# Patient Record
Sex: FEMALE | Race: WHITE | NOT HISPANIC OR LATINO | Employment: OTHER | URBAN - METROPOLITAN AREA
[De-identification: names, ages, dates, MRNs, and addresses within clinical notes are randomized per-mention and may not be internally consistent; named-entity substitution may affect disease eponyms.]

---

## 2017-08-23 ENCOUNTER — OFFICE VISIT (OUTPATIENT)
Dept: OBSTETRICS AND GYNECOLOGY | Facility: CLINIC | Age: 62
End: 2017-08-23

## 2017-08-23 VITALS
RESPIRATION RATE: 16 BRPM | OXYGEN SATURATION: 97 % | BODY MASS INDEX: 30.99 KG/M2 | SYSTOLIC BLOOD PRESSURE: 118 MMHG | DIASTOLIC BLOOD PRESSURE: 70 MMHG | WEIGHT: 186 LBS | HEART RATE: 47 BPM | HEIGHT: 65 IN

## 2017-08-23 DIAGNOSIS — N90.89 VULVAR LESION: ICD-10-CM

## 2017-08-23 DIAGNOSIS — N39.41 URGE URINARY INCONTINENCE: ICD-10-CM

## 2017-08-23 DIAGNOSIS — Z01.419 WELL WOMAN EXAM WITH ROUTINE GYNECOLOGICAL EXAM: Primary | ICD-10-CM

## 2017-08-23 PROCEDURE — G0101 CA SCREEN;PELVIC/BREAST EXAM: HCPCS | Performed by: NURSE PRACTITIONER

## 2017-08-23 RX ORDER — OXYBUTYNIN CHLORIDE 10 MG/1
10 TABLET, EXTENDED RELEASE ORAL DAILY
Qty: 30 TABLET | Refills: 3 | Status: SHIPPED | OUTPATIENT
Start: 2017-08-23 | End: 2017-12-06 | Stop reason: SDUPTHER

## 2017-08-23 RX ORDER — DIVALPROEX SODIUM 250 MG/1
TABLET, DELAYED RELEASE ORAL
Refills: 0 | COMMUNITY
Start: 2017-07-07 | End: 2020-05-04

## 2017-08-23 RX ORDER — POLYETHYLENE GLYCOL 3350 17 G/17G
POWDER, FOR SOLUTION ORAL
Refills: 5 | COMMUNITY
Start: 2017-08-01

## 2017-08-23 RX ORDER — HYDROCHLOROTHIAZIDE 12.5 MG/1
CAPSULE, GELATIN COATED ORAL
Refills: 1 | COMMUNITY
Start: 2017-07-07 | End: 2021-04-20 | Stop reason: ALTCHOICE

## 2017-08-23 RX ORDER — HYDROXYZINE HYDROCHLORIDE 25 MG/1
TABLET, FILM COATED ORAL
Refills: 1 | COMMUNITY
Start: 2017-08-01 | End: 2020-05-04

## 2017-08-23 RX ORDER — ACETAMINOPHEN, ASPIRIN AND CAFFEINE 250; 250; 65 MG/1; MG/1; MG/1
1 TABLET, FILM COATED ORAL EVERY 6 HOURS PRN
COMMUNITY
End: 2021-04-20

## 2017-08-23 RX ORDER — CLONIDINE HYDROCHLORIDE 0.2 MG/1
TABLET ORAL
Refills: 0 | COMMUNITY
Start: 2017-08-01 | End: 2017-12-06 | Stop reason: DRUGHIGH

## 2017-08-23 RX ORDER — ATORVASTATIN CALCIUM 20 MG/1
20 TABLET, FILM COATED ORAL DAILY
COMMUNITY
End: 2020-05-04

## 2017-08-23 RX ORDER — LISINOPRIL 40 MG/1
TABLET ORAL
Refills: 1 | COMMUNITY
Start: 2017-07-07 | End: 2017-12-06 | Stop reason: DRUGHIGH

## 2017-08-23 RX ORDER — TIZANIDINE 2 MG/1
TABLET ORAL
Refills: 1 | COMMUNITY
Start: 2017-06-20 | End: 2021-04-20

## 2017-08-29 ENCOUNTER — TELEPHONE (OUTPATIENT)
Dept: GYNECOLOGIC ONCOLOGY | Facility: CLINIC | Age: 62
End: 2017-08-29

## 2017-08-29 NOTE — PROGRESS NOTES
[facility] NEW PATIENT ANNUAL WELL WOMAN VISIT      Noemi Casey  [id number]  1955      Chief Complaint: [facility] (Genital lesion)        History of present illness:    Noemi Casey is a 62 y.o. year old  menopausal female, new to our office, presenting to [facility]. She is in need of an annual exam, but presents with c/o vulvar lesion x1 year and urinary incontinence.  This past year she has not been on hormone replacement therapy.  There has not been vaginal bleeding in the last 12 months.  Menopausal symptoms are not present.    She reports she has been menopausal since her complete hysterectomy in . She has not had any regular GYN care in over 5 years. She does not know of last pap smear date, last mammogram was over 5 years ago, and she has never had a colonoscopy. History is significant for abnormal pap smears, s/p cryo, and genital warts. She also reports having bladder polyps removed surgically in  and was told these were cancer but no other treatments or follow-ups were needed. She has been a lifetime smoker who has been cutting back over the last 5 years and quit 2 months ago. She states she is very motivated to remain tobacco free.     She first noticed this new lesion approx 1 year ago. She states it has gotten somewhat larger, is darker than the surrounding tissue, and has irregular borders. It does not itch or bleed. It is not painful. She would like this examined and biopsied if necessary today.   She also c/o urinary incontinence that requires her to wear a pad at all times. She reports little to no sensation prior to leaking during the day. She has sensation at night, but cannot make it to the restroom once she feels the urge. She has frequency and feels she does not always empty completely.     SEXUAL Hx:  She is not currently sexually active.  In the past year there has not been new sexual partners.    Condoms are not typically used.  She would not  like to be screened for STD's at today's exam.  HEALTH Hx:  She exercises regularly: no (and has no plans to become more active).  She wears her seat belt:yes.  She has concerns about domestic violence: no.  She has noticed changes in height: no.   She is a former smoker, quit <1 years ago.    Past Medical History:   Diagnosis Date   • Bipolar 1 disorder    • Bladder polyp    • Bowel obstruction 2005   • Bradycardia    • Genital warts due to HPV (human papillomavirus)    • History of abnormal cervical Pap smear    • Hyperlipidemia    • Hypertension        Past Surgical History:   Procedure Laterality Date   • COLON RESECTION     • CRYOABLATION  1979   • TOTAL ABDOMINAL HYSTERECTOMY WITH SALPINGO OOPHORECTOMY Bilateral 1999       MEDICATIONS: The current medication list was reviewed and reconciled.     Allergies:  has No Known Allergies.    Family History   Problem Relation Age of Onset   • Ovarian cancer Mother    • Osteoporosis Mother    • Ovarian cancer Sister    • Hypertension Father    • Heart attack Father    • Stroke Father    • Diabetes Father    • Hypertension Brother        Health Maintenance: All overdue. Last pap date is unknown, she does have a history of abnormal pap smears. Last mammogram was over 5 years ago. She has never had a colonoscopy or DEXA.     Review of Systems   Constitutional: Negative for fatigue, fever and unexpected weight change.   HENT: Negative for congestion, ear pain, hearing loss, sinus pressure and trouble swallowing.    Eyes: Negative for visual disturbance.   Respiratory: Negative for cough, chest tightness, shortness of breath and wheezing.    Cardiovascular: Negative for chest pain, palpitations and leg swelling.   Gastrointestinal: Negative for abdominal distention, abdominal pain, constipation, diarrhea, nausea and vomiting.   Endocrine: Negative for cold intolerance, heat intolerance, polydipsia, polyphagia and polyuria.   Genitourinary: Positive for difficulty urinating  "(incomplete emptying and leaking throughout the day), frequency, genital sores (non-painful lesion x 1 year) and urgency. Negative for dysuria, hematuria, pelvic pain, vaginal bleeding, vaginal discharge and vaginal pain.   Musculoskeletal: Negative for arthralgias, gait problem, joint swelling and myalgias.   Skin: Negative for color change, pallor and rash.   Neurological: Negative for dizziness, seizures, syncope, weakness, light-headedness, numbness and headaches.   Hematological: Negative for adenopathy. Does not bruise/bleed easily.   Psychiatric/Behavioral: Negative for agitation, confusion, sleep disturbance and suicidal ideas. The patient is not nervous/anxious.        Physical Exam  Vital Signs: /70  Pulse (!) 47  Resp 16  Ht 65\" (165.1 cm)  Wt 186 lb (84.4 kg)  LMP  (LMP Unknown)  SpO2 97%  Breastfeeding? No  BMI 30.95 kg/m2   General Appearance:  alert, cooperative, no apparent distress and appears stated age   Neurologic/Psychiatric: A&O x 3, gait steady, appropriate affect   HEENT:  Normocephalic, without obvious abnormality, mucous membranes moist   Neck: Supple, symmetrical, trachea midline, no adenopathy;  No thyromegaly, masses, or tenderness   Back:   Symmetric, no curvature, ROM normal, no CVA tenderness   Lungs:   Clear to auscultation bilaterally; respirations regular, even, and unlabored bilaterally   Heart:  Regular rate and rhythm, no murmurs appreciated   Breasts:  Symmetrical, no masses, no lesions and no nipple discharge   Abdomen:   Soft, non-tender, non-distended and no organomegaly   Lymph nodes: No cervical, supraclavicular, inguinal or axillary adenopathy noted   Extremities: Normal, atraumatic; no clubbing, cyanosis, or edema    Skin: No rashes, ulcers, or suspicious lesions noted   Pelvic: External Genitalia  generally atrophic with 2 cm dark pigmented lesion to posterior left vulva. Lesion is slightly raised with irregular borders.   Vagina  is pale, atrophic. "   Vaginal Cuff  Female Vaginal Cuff: smooth, intact, without visible lesions   Uterus  surgically absent  Ovaries  surgically absent bilaterallly  Parametria  smooth  Rectovaginal  Female rectovaginal: confirms no masses or bleeding and Hemoccult negative       Procedure Note:  After discussion of procedure and obtaining consent a vulvar punch biopsy was performed. Lesion located upon posterior left vulva, approx 2 cm in size. Site cleaned with betadine in the usual fashion. 3 mL lidocaine with epinephrine injected beneath lesion for local anesthesia. Punch biopsy performed and tissue sample cut away from underlying tissue. Specimen placed into appropriate container and labeled at bedside. Bleeding was Minimal. Silver nitrate used to achieve hemostasis. Patient tolerated the procedure well.       Assessment and Plan:    Noemi was seen today for establish care.    Diagnoses and all orders for this visit:    Well woman exam with routine gynecological exam    Vulvar lesion  -     Tissue Pathology Exam; Future  -     Tissue Pathology Exam  -     Pap IG, Rfx HPV ASCU; Future    Urge urinary incontinence  -     oxybutynin XL (DITROPAN-XL) 10 MG 24 hr tablet; Take 1 tablet by mouth Daily.    Biopsy taken of vulvar lesion in office today. This will be sent to pathology and I will notify her of results and any needed next steps upon return. Home care of biopsy site reviewed. Encouraged to call with any signs of bleeding or infection.     We discussed her urinary symptoms. This is most consistent with urge UI. There is no evidence of prolapse upon exam today. She is understanding that UI treatment can be a stepwise process and other medications may need to be tried if this is not effective. Urodynamic testing or more advanced interventions may also be considered in the future if symptoms persist.     Pap was done today.  If she does not receive the results of the Pap within 2 weeks  time, she was instructed to call to find  out the results.  I explained to Noemi that the recommendations for Pap smear interval in a low risk patient's has lengthened to 3 years time.  I encouraged her to be seen yearly for a full physical exam including breast and pelvic exam even during the off years when PAP's will not be performed.    She was encouraged to get yearly mammograms.  She should report any palpable breast lump(s) or skin changes regardless of mammographic findings.  I explained to Noemi that notification regarding her mammogram results will come from the center performing the study.  Our office will not be routinely calling with mammogram results.  It is her responsibility to make sure that the results from the mammogram are communicated to her by the breast center.  If she has any questions about the results, she is welcome to call our office anytime.  The phone number to schedule a mammogram will be given to Noemi today at the time of check out.  I explained that she should be able to call the center directly to schedule her screening mammogram without a physician's order.  So long as she gives them our name, a copy of the mammogram report should be sent to us for review.    We discussed recommendations for colon cancer screening. She declines referral at this time.     She was recommended to begin bone density scans (DEXA) at age 60-65 for osteoporosis screening.    Return in about 3 months (around 11/23/2017) for bladder medication follow-up.      Mony Rebolledo, YANIRA      Note: Speech recognition transcription software was used to dictate portions of this document.  An attempt at proofreading has been made though minor errors in transcription may still be present.  Please do not hesitate to call our office with any questions.

## 2017-08-29 NOTE — TELEPHONE ENCOUNTER
Called patient to notify vulvar biopsy results have returned, benign seborrheic keratosis. No evidence of dysplasia or viral change. She is very pleased to hear this. No additional follow-up is needed. She is encouraged to call throughout the year with any questions, concerns, or general GYN problems. Otherwise, RTC for annual exam next year. She v/u.

## 2017-12-06 ENCOUNTER — OFFICE VISIT (OUTPATIENT)
Dept: OBSTETRICS AND GYNECOLOGY | Facility: CLINIC | Age: 62
End: 2017-12-06

## 2017-12-06 VITALS
BODY MASS INDEX: 31.29 KG/M2 | OXYGEN SATURATION: 94 % | SYSTOLIC BLOOD PRESSURE: 98 MMHG | WEIGHT: 187.8 LBS | HEIGHT: 65 IN | DIASTOLIC BLOOD PRESSURE: 60 MMHG | HEART RATE: 45 BPM | RESPIRATION RATE: 14 BRPM

## 2017-12-06 DIAGNOSIS — N39.41 URGE URINARY INCONTINENCE: Primary | ICD-10-CM

## 2017-12-06 PROCEDURE — 99212 OFFICE O/P EST SF 10 MIN: CPT | Performed by: NURSE PRACTITIONER

## 2017-12-06 RX ORDER — OXYBUTYNIN CHLORIDE 10 MG/1
10 TABLET, EXTENDED RELEASE ORAL DAILY
Qty: 30 TABLET | Refills: 12 | Status: SHIPPED | OUTPATIENT
Start: 2017-12-06 | End: 2021-04-14 | Stop reason: ALTCHOICE

## 2017-12-06 RX ORDER — CLONIDINE HYDROCHLORIDE 0.3 MG/1
TABLET ORAL
Refills: 6 | COMMUNITY
Start: 2017-11-08

## 2017-12-06 RX ORDER — BUSPIRONE HYDROCHLORIDE 15 MG/1
TABLET ORAL
Refills: 5 | COMMUNITY
Start: 2017-11-02 | End: 2020-05-04

## 2017-12-06 RX ORDER — LISINOPRIL AND HYDROCHLOROTHIAZIDE 20; 12.5 MG/1; MG/1
TABLET ORAL
Refills: 6 | COMMUNITY
Start: 2017-11-07 | End: 2021-04-14 | Stop reason: ALTCHOICE

## 2017-12-06 NOTE — PROGRESS NOTES
[facility] FOLLOW-UP    Noemi Casey  [id number]  1955    Chief Complaint: Follow-up (No complaints)        History of present illness:  Noemi Casey is a 62 y.o. year old female who is here today for follow-up for new bladder medication.  She was last seen as a new patient on 8/23/2017.  At that time she underwent vulvar biopsy for a suspicious lesion that was found to be benign.  She also had concerns regarding urinary urgency incontinence that was interfering with daily life.  She was needing a pad at all times and describes little to no sensation prior to leaking during the day.  She was started on management with oxybutynin XL 10 mg daily.  She reports this medication has been very helpful.  She has little to no leaking now and is able to often go without a pad.  She is very happy with her medication and requests refills.    Although Noemi is feeling physically very well today, she is somewhat tearful today related to stress at home.  She lives with her son who has recently been granted full custody of her 12 year old grandson.  This child has had a great deal of turmoil in his life related to birth trauma, home instability, and a mother with numerous addictions.  He is currently in special needs classes, but Noemi has been surprised to see how very far behind he is.  She describes a great deal worry about this child and is trying to work with the school to help him more at home.     Past Medical History:   Diagnosis Date   • Bipolar 1 disorder    • Bladder polyp    • Bowel obstruction 2005   • Bradycardia    • Genital warts due to HPV (human papillomavirus)    • History of abnormal cervical Pap smear    • Hyperlipidemia    • Hypertension        Past Surgical History:   Procedure Laterality Date   • COLON RESECTION     • CRYOABLATION  1979   • TOTAL ABDOMINAL HYSTERECTOMY WITH SALPINGO OOPHORECTOMY Bilateral 1999       MEDICATIONS: The current medication list was reviewed and  "reconciled.     Allergies:  has No Known Allergies.    Family History   Problem Relation Age of Onset   • Ovarian cancer Mother    • Osteoporosis Mother    • Ovarian cancer Sister    • Hypertension Father    • Heart attack Father    • Stroke Father    • Diabetes Father    • Hypertension Brother        Review of Systems   Constitutional: Negative for appetite change, chills, fatigue, fever and unexpected weight change.   Respiratory: Negative for cough, shortness of breath and wheezing.    Cardiovascular: Negative for chest pain, palpitations and leg swelling.   Gastrointestinal: Negative for abdominal distention, abdominal pain, blood in stool, constipation, diarrhea, nausea and vomiting.   Endocrine: Negative.    Genitourinary: Negative for dyspareunia, dysuria, frequency, genital sores, hematuria, pelvic pain, urgency, vaginal bleeding, vaginal discharge and vaginal pain.   Musculoskeletal: Negative for arthralgias, gait problem and joint swelling.   Neurological: Negative for dizziness, seizures, syncope, weakness, light-headedness, numbness and headaches.   Hematological: Negative for adenopathy.   Psychiatric/Behavioral: The patient is nervous/anxious (r/t stress at home).        Physical Exam  Vital Signs: BP 98/60  Pulse (!) 45  Resp 14  Ht 165.1 cm (65\")  Wt 85.2 kg (187 lb 12.8 oz)  LMP  (LMP Unknown)  SpO2 94%  Breastfeeding? No  BMI 31.25 kg/m2   General Appearance:  alert, cooperative, no apparent distress and appears stated age.   Neurologic/Psychiatric: A&O x 3, gait steady, appropriate affect. Tearful today.    HEENT:  Normocephalic, without obvious abnormality, mucous membranes moist   Lungs:   Clear to auscultation bilaterally; respirations regular, even, and unlabored bilaterally   Heart:  Regular rate and rhythm, no murmurs appreciated   Abdomen:   Soft, non-tender, non-distended and no organomegaly   Extremities: Normal, atraumatic; no clubbing, cyanosis, or edema    Pelvic: deferred "       Procedure Notes:  No notes on file    Assessment and Plan:    Noemi was seen today for follow-up.    Diagnoses and all orders for this visit:    Urge urinary incontinence  -     oxybutynin XL (DITROPAN-XL) 10 MG 24 hr tablet; Take 1 tablet by mouth Daily.      Successful management of urge urinary incontinence on oxybutynin, refills sent to local pharmacy ×1 year.    Noemi and I spent the remainder of her visit discussing her stresses at home.  She states she is just happy to have someone listen.  She does have a psychologist through her home health who visits once weekly.  Noemi has her direct contact and is able to contact them directly at any time for any urgent need.  She is encouraged to call our office for any gynecologic needs and  I am also happy to get her in contact with social work or therapy here at [facility] if they can be of any assistance. I am always happy to listen and certainly wish the best for her and her family.     Return for annual exam or PRN.      YANIRA Rebolledo      Note: Speech recognition transcription software was used to dictate portions of this document.  An attempt at proofreading has been made though minor errors in transcription may still be present.  Please do not hesitate to call our office with any questions.

## 2018-07-22 ENCOUNTER — LAB REQUISITION (OUTPATIENT)
Dept: LAB | Facility: HOSPITAL | Age: 63
End: 2018-07-22

## 2018-07-22 DIAGNOSIS — N39.0 URINARY TRACT INFECTION: ICD-10-CM

## 2018-07-22 LAB
BACTERIA UR QL AUTO: ABNORMAL /HPF
BILIRUB UR QL STRIP: NEGATIVE
CLARITY UR: ABNORMAL
COLOR UR: YELLOW
GLUCOSE UR STRIP-MCNC: NEGATIVE MG/DL
HGB UR QL STRIP.AUTO: ABNORMAL
HYALINE CASTS UR QL AUTO: ABNORMAL /LPF
KETONES UR QL STRIP: NEGATIVE
LEUKOCYTE ESTERASE UR QL STRIP.AUTO: ABNORMAL
NITRITE UR QL STRIP: POSITIVE
PH UR STRIP.AUTO: 5.5 [PH] (ref 5–8)
PROT UR QL STRIP: ABNORMAL
RBC # UR: ABNORMAL /HPF
REF LAB TEST METHOD: ABNORMAL
SP GR UR STRIP: 1.02 (ref 1–1.03)
SQUAMOUS #/AREA URNS HPF: ABNORMAL /HPF
UROBILINOGEN UR QL STRIP: ABNORMAL
WBC CLUMPS # UR AUTO: ABNORMAL /HPF
WBC UR QL AUTO: ABNORMAL /HPF

## 2018-07-22 PROCEDURE — 81001 URINALYSIS AUTO W/SCOPE: CPT | Performed by: INTERNAL MEDICINE

## 2018-07-22 PROCEDURE — 87077 CULTURE AEROBIC IDENTIFY: CPT | Performed by: INTERNAL MEDICINE

## 2018-07-22 PROCEDURE — 87086 URINE CULTURE/COLONY COUNT: CPT | Performed by: INTERNAL MEDICINE

## 2018-07-22 PROCEDURE — 87186 SC STD MICRODIL/AGAR DIL: CPT | Performed by: INTERNAL MEDICINE

## 2018-07-24 LAB — BACTERIA SPEC AEROBE CULT: ABNORMAL

## 2018-12-14 RX ORDER — OXYBUTYNIN CHLORIDE 10 MG/1
10 TABLET, EXTENDED RELEASE ORAL DAILY
Qty: 30 TABLET | Refills: 0 | OUTPATIENT
Start: 2018-12-14

## 2021-02-25 ENCOUNTER — TRANSCRIBE ORDERS (OUTPATIENT)
Dept: ADMINISTRATIVE | Facility: HOSPITAL | Age: 66
End: 2021-02-25

## 2021-02-25 DIAGNOSIS — N64.4 BREAST PAIN: Primary | ICD-10-CM

## 2021-03-25 ENCOUNTER — HOSPITAL ENCOUNTER (OUTPATIENT)
Dept: MAMMOGRAPHY | Facility: HOSPITAL | Age: 66
Discharge: HOME | End: 2021-03-25
Admitting: NURSE PRACTITIONER

## 2021-03-25 DIAGNOSIS — N64.4 BREAST PAIN: ICD-10-CM

## 2021-03-25 PROCEDURE — G0279 TOMOSYNTHESIS, MAMMO: HCPCS

## 2021-03-25 PROCEDURE — 77066 DX MAMMO INCL CAD BI: CPT

## 2021-04-14 RX ORDER — ATORVASTATIN CALCIUM 20 MG/1
40 TABLET, FILM COATED ORAL EVERY OTHER DAY
COMMUNITY

## 2021-04-14 RX ORDER — CHLORTHALIDONE 25 MG/1
12.5 TABLET ORAL DAILY
COMMUNITY
End: 2021-04-20

## 2021-04-14 RX ORDER — TOLTERODINE 4 MG/1
4 CAPSULE, EXTENDED RELEASE ORAL DAILY
COMMUNITY
End: 2021-04-20

## 2021-04-14 RX ORDER — UBIDECARENONE 100 MG
100 CAPSULE ORAL DAILY
COMMUNITY

## 2021-04-14 RX ORDER — LISINOPRIL 20 MG/1
20 TABLET ORAL DAILY
COMMUNITY
End: 2021-04-20 | Stop reason: ALTCHOICE

## 2021-04-20 ENCOUNTER — OFFICE VISIT (OUTPATIENT)
Dept: GASTROENTEROLOGY | Facility: CLINIC | Age: 66
End: 2021-04-20

## 2021-04-20 VITALS
SYSTOLIC BLOOD PRESSURE: 153 MMHG | DIASTOLIC BLOOD PRESSURE: 88 MMHG | TEMPERATURE: 96.4 F | RESPIRATION RATE: 16 BRPM | BODY MASS INDEX: 26.2 KG/M2 | HEART RATE: 69 BPM | HEIGHT: 66 IN | WEIGHT: 163 LBS

## 2021-04-20 DIAGNOSIS — Z01.818 PREOP TESTING: Primary | ICD-10-CM

## 2021-04-20 DIAGNOSIS — Z90.49 HISTORY OF COLON RESECTION: ICD-10-CM

## 2021-04-20 DIAGNOSIS — Z86.010 PERSONAL HISTORY OF COLONIC POLYPS: Primary | ICD-10-CM

## 2021-04-20 DIAGNOSIS — K59.04 CHRONIC IDIOPATHIC CONSTIPATION: ICD-10-CM

## 2021-04-20 PROBLEM — Z86.0100 PERSONAL HISTORY OF COLONIC POLYPS: Status: ACTIVE | Noted: 2021-04-20

## 2021-04-20 PROCEDURE — 99213 OFFICE O/P EST LOW 20 MIN: CPT | Performed by: NURSE PRACTITIONER

## 2021-04-20 RX ORDER — BISACODYL 5 MG/1
TABLET, DELAYED RELEASE ORAL
Qty: 4 TABLET | Refills: 0 | Status: SHIPPED | OUTPATIENT
Start: 2021-04-20

## 2021-04-20 RX ORDER — SODIUM CHLORIDE 9 MG/ML
70 INJECTION, SOLUTION INTRAVENOUS CONTINUOUS PRN
Status: CANCELLED | OUTPATIENT
Start: 2021-04-20

## 2021-04-20 RX ORDER — LISINOPRIL AND HYDROCHLOROTHIAZIDE 20; 12.5 MG/1; MG/1
1 TABLET ORAL 2 TIMES DAILY
COMMUNITY

## 2021-04-20 RX ORDER — MULTIVIT WITH MINERALS/LUTEIN
1000 TABLET ORAL DAILY
COMMUNITY

## 2021-04-20 RX ORDER — DIPHENOXYLATE HYDROCHLORIDE AND ATROPINE SULFATE 2.5; .025 MG/1; MG/1
TABLET ORAL DAILY
COMMUNITY

## 2021-04-20 RX ORDER — POLYETHYLENE GLYCOL 3350 17 G/17G
POWDER, FOR SOLUTION ORAL
Qty: 238 G | Refills: 0 | Status: SHIPPED | OUTPATIENT
Start: 2021-04-20 | End: 2021-11-08 | Stop reason: SDUPTHER

## 2021-04-20 NOTE — PROGRESS NOTES
New Patient Consult      Date: 2021   Patient Name: Noemi Casey  MRN: [id number]  : 1955     Primary Care Provider: Lucita Coelho APRN    Chief Complaint   Patient presents with   • Colon Cancer Screening     History of Present Illness: Noemi Casey is a 66 y.o. female who is here today to establish care with Gastroenterology for colon cancer screening.    The patient has a history of constipation for over 50 years. She has 1 bowel movement per week if she does not take Miralax. With Miralax, she is having at least 1 bowel movement per day. No significant rectal bleeding.  No abdominal pain. She had a bowel obstruction in  and had to have surgery. She is unsure of what type of surgery was performed and what, if any, type of bowel was removed.     No history of nausea or vomiting. No reflux or difficulty swallowing.     Her last colonoscopy was in 2018 with tubular adenomas by Dr. Jones in [place], [place] and was recommended to have colonoscopy in 3 years. She has not had EGD in the past.  Her grandfather had liver cancer. No family history of colon or stomach cancer.     Subjective      Past Medical History:   Diagnosis Date   • Bipolar 1 disorder (CMS/HCC)    • Bladder polyp    • Bowel obstruction (CMS/HCC)    • Bradycardia    • Colon polyp    • Diverticulosis    • Genital warts due to HPV (human papillomavirus)    • History of abnormal cervical Pap smear    • History of bladder cancer    • History of colon polyps    • History of traumatic brain injury    • Hyperlipidemia    • Hypertension    • Internal hemorrhoids    • MVP (mitral valve prolapse)    • Pain in both feet    • Palpitations    • Tattoo      Past Surgical History:   Procedure Laterality Date   • BLADDER SURGERY  2008    cancer   • COLON RESECTION      due to bowel obstruction   • COLONOSCOPY  2018   • CRYOABLATION     • TONSILLECTOMY     • TOTAL ABDOMINAL HYSTERECTOMY WITH SALPINGO  OOPHORECTOMY Bilateral 1999     Family History   Problem Relation Age of Onset   • Ovarian cancer Mother    • Osteoporosis Mother    • Ovarian cancer Sister    • Hypertension Father    • Heart attack Father    • Stroke Father    • Diabetes Father    • Hypertension Brother    • Liver cancer Paternal Grandfather    • Colon cancer Neg Hx      Social History     Socioeconomic History   • Marital status:      Spouse name: Not on file   • Number of children: Not on file   • Years of education: Not on file   • Highest education level: Not on file   Tobacco Use   • Smoking status: Current Every Day Smoker     Packs/day: 1.00     Types: Cigarettes   • Smokeless tobacco: Never Used   Vaping Use   • Vaping Use: Never used   Substance and Sexual Activity   • Alcohol use: No   • Drug use: No   • Sexual activity: Defer       Current Outpatient Medications:   •  atorvastatin (LIPITOR) 20 MG tablet, Take 40 mg by mouth Every Other Day., Disp: , Rfl:   •  Cariprazine HCl (Vraylar) 1.5 MG capsule capsule, Take 1.5 mg by mouth Daily., Disp: , Rfl:   •  CloNIDine (CATAPRES) 0.3 MG tablet, TK 1 T PO BID, Disp: , Rfl: 6  •  coenzyme Q10 100 MG capsule, Take 100 mg by mouth Daily., Disp: , Rfl:   •  lisinopril-hydrochlorothiazide (PRINZIDE,ZESTORETIC) 20-12.5 MG per tablet, Take 1 tablet by mouth 2 (two) times a day., Disp: , Rfl:   •  metoprolol tartrate (LOPRESSOR) 25 MG tablet, 50 mg Daily., Disp: , Rfl: 1  •  multivitamin (MULTI-VITAMIN DAILY PO), Take  by mouth Daily., Disp: , Rfl:   •  Omega-3 Fatty Acids (OMEGA 3 500 PO), Take 500 mg by mouth Daily., Disp: , Rfl:   •  polyethylene glycol (MIRALAX) powder, MIX 17 GRAMS WITH LIQUID AND TK PO QD PRN, Disp: , Rfl: 5  •  vitamin C (ASCORBIC ACID) 250 MG tablet, Take 1,000 mg by mouth Daily., Disp: , Rfl:   •  bisacodyl (DULCOLAX) 5 MG EC tablet, Take as directed for colon prep, Disp: 4 tablet, Rfl: 0  •  polyethylene glycol (MiraLax) 17 GM/SCOOP powder, Take as directed for  colonoscopy prep, Disp: 238 g, Rfl: 0    No Known Allergies     The following portions of the patient's history were reviewed and updated as appropriate: allergies, current medications, past family history, past medical history, past social history, past surgical history and problem list.    Objective     Physical Exam  Vitals and nursing note reviewed.   Constitutional:       General: She is awake. She is not in acute distress.     Appearance: Normal appearance. She is well-developed. She is not diaphoretic.   HENT:      Head: Normocephalic and atraumatic.      Right Ear: Hearing and external ear normal.      Left Ear: Hearing and external ear normal.      Nose: Nose normal.      Mouth/Throat:      Mouth: Mucous membranes are not pale, not dry and not cyanotic.   Eyes:      General: Lids are normal.         Right eye: No discharge.         Left eye: No discharge.      Conjunctiva/sclera: Conjunctivae normal.   Neck:      Thyroid: No thyroid mass or thyromegaly.      Vascular: No JVD.      Trachea: Trachea normal.   Cardiovascular:      Rate and Rhythm: Normal rate and regular rhythm.      Heart sounds: Normal heart sounds and S2 normal. No murmur heard.   No friction rub. No gallop. No S3 sounds.    Pulmonary:      Effort: Pulmonary effort is normal. No respiratory distress.      Breath sounds: Normal breath sounds.   Chest:      Chest wall: No tenderness.   Abdominal:      General: Bowel sounds are normal. There is no distension.      Palpations: Abdomen is not rigid. There is no hepatomegaly, splenomegaly or mass.      Tenderness: There is no abdominal tenderness. There is no guarding or rebound.      Hernia: No hernia is present.       Musculoskeletal:         General: Normal range of motion.      Cervical back: Neck supple. No edema.   Lymphadenopathy:      Cervical: No cervical adenopathy.      Upper Body:      Left upper body: No supraclavicular adenopathy.   Skin:     General: Skin is warm and dry.       "Coloration: Skin is not pale.      Findings: No rash.      Nails: There is no clubbing.   Neurological:      Mental Status: She is alert and oriented to person, place, and time.      Cranial Nerves: No cranial nerve deficit.      Sensory: No sensory deficit.   Psychiatric:         Mood and Affect: Mood normal.         Speech: Speech normal.         Behavior: Behavior is cooperative.       Vitals:    04/20/21 0909   BP: 153/88   Pulse: 69   Resp: 16   Temp: 96.4 °F (35.8 °C)   Weight: 73.9 kg (163 lb)   Height: 167.6 cm (66\")     Results Review:   I have reviewed the patient's new clinical and imaging results.    No labs or imaging to review     Assessment / Plan      1. Personal history of colonic polyps  Last colonoscopy was in 2018 with tubular adenomas per Dr. Jones in [facility].  She was recommended to have colonoscopy in 3 years.  There is no family history of colon cancer.  Colonoscopy for surveillance.    - Case Request; Standing  - Implement Anesthesia Orders Day of Procedure; Standing  - Obtain Informed Consent; Standing  - Verify Bowel Prep Was Successful; Standing  - Oxygen Therapy- Nasal Cannula; 2 LPM; Titrate for SPO2: equal to or greater than, 90%; Standing  - sodium chloride 0.9 % infusion  - Case Request  - polyethylene glycol (MiraLax) 17 GM/SCOOP powder; Take as directed for colonoscopy prep  Dispense: 238 g; Refill: 0  - bisacodyl (DULCOLAX) 5 MG EC tablet; Take as directed for colon prep  Dispense: 4 tablet; Refill: 0    2. History of colon resection  Patient had colon resection in 2007 for a bowel obstruction.  Patient is unsure of what, if any, type of bowel was removed. Well healed periumbilical abdominal scar noted.    3. Chronic idiopathic constipation  History of constipation since she was a small child.  She takes MiraLAX daily with reasonable control, she has at least one bowel movement per day.  No history of significant rectal bleeding.  Low fiber diet with liberal water " intake.   Continue Miralax daily.    Patient Instructions   1. Low fiber diet with liberal water intake.   2. Miralax 17 grams daily in 8 ounces of liquid  Colonoscopy: Description of the procedure, risks, benefits, alternatives and options, including nonoperative options, were discussed with the patient in detail. The patient understands and wishes to proceed.  COVID-19 testing prior to procedure. The patient will need to self-quarantine after testing until the procedure. Instructions given to patient.     Tonja Barrett, APRN  4/20/2021    Please note that portions of this note may have been completed with a voice recognition program. Efforts were made to edit the dictations, but occasionally words are mistranscribed.

## 2021-04-20 NOTE — PATIENT INSTRUCTIONS
1. Low fiber diet with liberal water intake.   2. Miralax 17 grams daily in 8 ounces of liquid  Colonoscopy: Description of the procedure, risks, benefits, alternatives and options, including nonoperative options, were discussed with the patient in detail. The patient understands and wishes to proceed.  COVID-19 testing prior to procedure. The patient will need to self-quarantine after testing until the procedure. Instructions given to patient.

## 2021-05-28 ENCOUNTER — TELEPHONE (OUTPATIENT)
Dept: GASTROENTEROLOGY | Facility: CLINIC | Age: 66
End: 2021-05-28

## 2021-05-28 NOTE — TELEPHONE ENCOUNTER
Patient called today and cancelled her colonoscopy scheduled for 06/02/2021. States that she has a family emergency and needs to go out of state. Will call to reschedule once she returns.

## 2021-06-16 ENCOUNTER — TRANSCRIBE ORDERS (OUTPATIENT)
Dept: ADMINISTRATIVE | Facility: HOSPITAL | Age: 66
End: 2021-06-16

## 2021-06-16 DIAGNOSIS — G47.33 OBSTRUCTIVE SLEEP APNEA: Primary | ICD-10-CM

## 2021-07-19 ENCOUNTER — APPOINTMENT (OUTPATIENT)
Dept: SLEEP MEDICINE | Facility: HOSPITAL | Age: 66
End: 2021-07-19

## 2021-07-21 ENCOUNTER — TELEPHONE (OUTPATIENT)
Dept: GASTROENTEROLOGY | Facility: CLINIC | Age: 66
End: 2021-07-21

## 2021-07-21 NOTE — TELEPHONE ENCOUNTER
Called patient, left message about procedure appt 07/28 at 730 am, please call back to confirm or R/S.

## 2021-08-05 ENCOUNTER — OFFICE VISIT (OUTPATIENT)
Dept: UROLOGY | Facility: CLINIC | Age: 66
End: 2021-08-05

## 2021-08-05 VITALS
DIASTOLIC BLOOD PRESSURE: 80 MMHG | HEIGHT: 66 IN | HEART RATE: 55 BPM | OXYGEN SATURATION: 93 % | WEIGHT: 163 LBS | TEMPERATURE: 95.2 F | SYSTOLIC BLOOD PRESSURE: 122 MMHG | BODY MASS INDEX: 26.2 KG/M2

## 2021-08-05 DIAGNOSIS — Z85.51 HISTORY OF BLADDER CANCER: Primary | ICD-10-CM

## 2021-08-05 PROCEDURE — 99204 OFFICE O/P NEW MOD 45 MIN: CPT | Performed by: UROLOGY

## 2021-08-05 RX ORDER — TRAZODONE HYDROCHLORIDE 50 MG/1
TABLET ORAL
COMMUNITY
Start: 2021-08-03

## 2021-08-05 NOTE — PROGRESS NOTES
Chief Complaint  Chief Complaint   Patient presents with   • Bladder Cancer     New Patient with history of bladder cancer. She now has a bladder mass      Referring Provider:  Madhav Mitchell MD    HPI  Mr. Casey is a 66 y.o. male with below past medical history who presents with history of bladder cancer, status post TURBT in 2007.    No FU since then. No gross hematuria.   + UUI. 4-5ppd. Has already tried ditropan  She just quit smoking    Past Medical History  Past Medical History:   Diagnosis Date   • Bipolar 1 disorder (CMS/HCC)    • Bladder polyp    • Bowel obstruction (CMS/HCC) 2005   • Bradycardia    • Colon polyp 2018   • Diverticulosis    • Genital warts due to HPV (human papillomavirus)    • History of abnormal cervical Pap smear    • History of bladder cancer 2017   • History of colon polyps    • History of traumatic brain injury 1998   • Hyperlipidemia    • Hypertension    • Internal hemorrhoids    • MVP (mitral valve prolapse)    • Pain in both feet    • Palpitations    • Tattoo        Past Surgical History  Past Surgical History:   Procedure Laterality Date   • BLADDER SURGERY  2008    cancer   • COLON RESECTION  2007    due to bowel obstruction   • COLONOSCOPY  2018   • CRYOABLATION  1979   • TONSILLECTOMY     • TOTAL ABDOMINAL HYSTERECTOMY WITH SALPINGO OOPHORECTOMY Bilateral 1999       Medications    Current Outpatient Medications:   •  atorvastatin (LIPITOR) 20 MG tablet, Take 40 mg by mouth Every Other Day., Disp: , Rfl:   •  bisacodyl (DULCOLAX) 5 MG EC tablet, Take as directed for colon prep, Disp: 4 tablet, Rfl: 0  •  Cariprazine HCl (Vraylar) 1.5 MG capsule capsule, Take 1.5 mg by mouth Daily., Disp: , Rfl:   •  CloNIDine (CATAPRES) 0.3 MG tablet, TK 1 T PO BID, Disp: , Rfl: 6  •  coenzyme Q10 100 MG capsule, Take 100 mg by mouth Daily., Disp: , Rfl:   •  lisinopril-hydrochlorothiazide (PRINZIDE,ZESTORETIC) 20-12.5 MG per tablet, Take 1 tablet by mouth 2 (two) times a day., Disp: ,  "Rfl:   •  metoprolol tartrate (LOPRESSOR) 25 MG tablet, 50 mg Daily., Disp: , Rfl: 1  •  multivitamin (MULTI-VITAMIN DAILY PO), Take  by mouth Daily., Disp: , Rfl:   •  Omega-3 Fatty Acids (OMEGA 3 500 PO), Take 500 mg by mouth Daily., Disp: , Rfl:   •  polyethylene glycol (MiraLax) 17 GM/SCOOP powder, Take as directed for colonoscopy prep, Disp: 238 g, Rfl: 0  •  traZODone (DESYREL) 50 MG tablet, , Disp: , Rfl:   •  vitamin C (ASCORBIC ACID) 250 MG tablet, Take 1,000 mg by mouth Daily., Disp: , Rfl:   •  polyethylene glycol (MIRALAX) powder, MIX 17 GRAMS WITH LIQUID AND TK PO QD PRN, Disp: , Rfl: 5    Allergies  No Known Allergies    Social History  Social History     Socioeconomic History   • Marital status:      Spouse name: Not on file   • Number of children: Not on file   • Years of education: Not on file   • Highest education level: Not on file   Tobacco Use   • Smoking status: Former Smoker     Packs/day: 1.00     Types: Cigarettes   • Smokeless tobacco: Never Used   Vaping Use   • Vaping Use: Never used   Substance and Sexual Activity   • Alcohol use: No   • Drug use: No   • Sexual activity: Defer       Family History  Family History   Problem Relation Age of Onset   • Ovarian cancer Mother    • Osteoporosis Mother    • Ovarian cancer Sister    • Hypertension Father    • Heart attack Father    • Stroke Father    • Diabetes Father    • Hypertension Brother    • Liver cancer Paternal Grandfather    • Colon cancer Neg Hx        Review of Systems  Review of systems was notable for UUI.    Physical Exam  Visit Vitals  /80   Pulse 55   Temp 95.2 °F (35.1 °C)   Ht 167.6 cm (66\")   Wt 73.9 kg (163 lb)   LMP  (LMP Unknown)   SpO2 93%   Breastfeeding No   BMI 26.31 kg/m²     Physical exam was performed and was notable for normal habitus    Labs  No results found for: PSA    Lab Results   Component Value Date    CALCIUM 10.2 05/06/2016     (H) 05/06/2016    K 3.7 05/06/2016    CO2 31 (H) 05/06/2016 "     05/06/2016    BUN 10 05/06/2016    CREATININE 0.7 05/06/2016    BCR 12.9 05/06/2016    ANIONGAP 15 05/06/2016       Lab Results   Component Value Date    WBC 6.2 05/06/2016    HGB 13.8 05/06/2016    HCT 41 05/06/2016    MCV 89.1 05/06/2016     05/06/2016       Brief Urine Lab Results     None           Radiologic Studies  No Images in the past 120 days found..      Assessment  Mr. Casey is a 66 y.o. female who presents with Hx of bladder CA.  No follow-up since 2007.  No recent gross hematuria.  +UUI. Failed ditropan. BP not well controlled    Plan  1.  CT Urogram   2. FU for cystoscopy.  Risk factors include history of smoking      Salinas Rodgers MD

## 2021-09-22 ENCOUNTER — APPOINTMENT (OUTPATIENT)
Dept: CT IMAGING | Facility: HOSPITAL | Age: 66
End: 2021-09-22

## 2021-09-28 ENCOUNTER — APPOINTMENT (OUTPATIENT)
Dept: CT IMAGING | Facility: HOSPITAL | Age: 66
End: 2021-09-28

## 2021-10-07 ENCOUNTER — TELEPHONE (OUTPATIENT)
Dept: GASTROENTEROLOGY | Facility: CLINIC | Age: 66
End: 2021-10-07

## 2021-10-07 NOTE — TELEPHONE ENCOUNTER
Patient is scheduled for procedure on 10/14/2021 however due to increased COVID-19 cases the hospital is not allowing us to do procedures at this time. Called patient and cancelled procedure. Will call to reschedule once we have clearance to do so

## 2021-10-22 NOTE — TELEPHONE ENCOUNTER
Called patient to reschedule colonoscopy. She states that she wants to wait until after the first of the year. She will call back when she's ready to schedule

## 2021-11-05 ENCOUNTER — TELEPHONE (OUTPATIENT)
Dept: GASTROENTEROLOGY | Facility: CLINIC | Age: 66
End: 2021-11-05

## 2021-11-05 NOTE — TELEPHONE ENCOUNTER
CALLED PATIENT TO SCHEDULE COLONOSCOPY. REACHED VOICEMAIL. LEFT DETAILED MESSAGE FOR RETURN CALL TO DISCUSS.

## 2021-11-08 DIAGNOSIS — Z86.010 PERSONAL HISTORY OF COLONIC POLYPS: Primary | ICD-10-CM

## 2021-11-08 RX ORDER — SODIUM CHLORIDE 9 MG/ML
30 INJECTION, SOLUTION INTRAVENOUS CONTINUOUS PRN
Status: CANCELLED | OUTPATIENT
Start: 2021-11-08

## 2021-11-08 RX ORDER — POLYETHYLENE GLYCOL 3350 17 G/17G
POWDER, FOR SOLUTION ORAL
Qty: 238 G | Refills: 0 | Status: SHIPPED | OUTPATIENT
Start: 2021-11-08

## 2021-12-17 ENCOUNTER — TELEPHONE (OUTPATIENT)
Dept: UROLOGY | Facility: CLINIC | Age: 66
End: 2021-12-17

## 2021-12-17 NOTE — TELEPHONE ENCOUNTER
Caller:   CHUY ERICKSON  Relationship to patient: SELF    Best call back number: [phone]    Chief complaint:NEEDS TO RESCHEDULE PROCEDURE.    Type of visit: PROCEDURE    Requested date: UNKNOWN    If rescheduling, when is the original appointment: 1/3/21     Additional notes:PLEASE CALL BACK TO RESCHEDULE

## 2025-01-31 ENCOUNTER — DOCUMENTATION (OUTPATIENT)
Dept: FAMILY MEDICINE CLINIC | Facility: CLINIC | Age: 70
End: 2025-01-31
Payer: MEDICARE

## 2025-01-31 RX ORDER — PANTOPRAZOLE SODIUM 40 MG/1
40 TABLET, DELAYED RELEASE ORAL DAILY
COMMUNITY

## 2025-01-31 RX ORDER — CARIPRAZINE 6 MG/1
6 CAPSULE, GELATIN COATED ORAL DAILY
COMMUNITY

## 2025-01-31 RX ORDER — ACETAMINOPHEN 500 MG
1000 TABLET ORAL EVERY 8 HOURS PRN
COMMUNITY

## 2025-01-31 RX ORDER — LOPERAMIDE HYDROCHLORIDE 2 MG/1
2 CAPSULE ORAL 4 TIMES DAILY PRN
COMMUNITY

## 2025-02-05 ENCOUNTER — NURSING HOME (OUTPATIENT)
Age: 70
End: 2025-02-05
Payer: MEDICARE

## 2025-02-05 VITALS
BODY MASS INDEX: 23.66 KG/M2 | WEIGHT: 142 LBS | SYSTOLIC BLOOD PRESSURE: 134 MMHG | OXYGEN SATURATION: 96 % | HEART RATE: 107 BPM | TEMPERATURE: 97.8 F | RESPIRATION RATE: 18 BRPM | DIASTOLIC BLOOD PRESSURE: 66 MMHG | HEIGHT: 65 IN

## 2025-02-05 DIAGNOSIS — K51.00 PANCOLITIS: ICD-10-CM

## 2025-02-05 DIAGNOSIS — Z74.09 IMPAIRED MOBILITY AND ADLS: Primary | ICD-10-CM

## 2025-02-05 DIAGNOSIS — R53.81 PHYSICAL DECONDITIONING: ICD-10-CM

## 2025-02-05 DIAGNOSIS — I10 ESSENTIAL HYPERTENSION: ICD-10-CM

## 2025-02-05 DIAGNOSIS — J44.89 CHRONIC OBSTRUCTIVE BRONCHITIS: ICD-10-CM

## 2025-02-05 DIAGNOSIS — F31.9 BIPOLAR AFFECTIVE DISORDER, REMISSION STATUS UNSPECIFIED: ICD-10-CM

## 2025-02-05 DIAGNOSIS — Z78.9 IMPAIRED MOBILITY AND ADLS: Primary | ICD-10-CM

## 2025-02-26 PROBLEM — Z74.09 IMPAIRED MOBILITY AND ADLS: Status: ACTIVE | Noted: 2025-02-26

## 2025-02-26 PROBLEM — J44.89 CHRONIC OBSTRUCTIVE BRONCHITIS: Status: ACTIVE | Noted: 2025-02-26

## 2025-02-26 PROBLEM — K51.00 PANCOLITIS: Status: ACTIVE | Noted: 2025-02-26

## 2025-02-26 PROBLEM — F31.9 BIPOLAR DISORDER: Status: ACTIVE | Noted: 2025-02-26

## 2025-02-26 PROBLEM — R53.81 PHYSICAL DECONDITIONING: Status: ACTIVE | Noted: 2025-02-26

## 2025-02-26 PROBLEM — I10 ESSENTIAL HYPERTENSION: Status: ACTIVE | Noted: 2025-02-26

## 2025-02-26 PROBLEM — Z78.9 IMPAIRED MOBILITY AND ADLS: Status: ACTIVE | Noted: 2025-02-26

## 2025-02-27 NOTE — PROGRESS NOTES
Nursing Home History/Physical        Vicente DO Shannon [x]   YANIRA Ibanez []  [address] [place] [address]  Phone: [phone]  Fax: [phone] Luis Guzman MD []  Boubacar Deluca DO []  [address] [place] [address]  Phone: [phone]  Fax: [phone]     PATIENT NAME: Noemi Casey                                                                          YOB: 1955           DATE OF SERVICE: 02/05/2025  FACILITY:  []  [facility]  []  [facility]   []  [facility]   [x] [facility]    [] Other ______________________________________________________________________     CHIEF COMPLAINT:  Impaired mobility and ADLs/physical deconditioning/pancolitis/hypertension/bipolar disorder/chronic struct of bronchitis      HISTORY OF PRESENT ILLNESS:      [x]  Initial visit for coordination of long term care issues and chronic medical management needs.    I have reviewed labs/imaging/records from this hospitalization, including ER staff and admitting/attending physicians H/P's and progress notes to establish a comprehensive understanding of this patient's clinical hospital course, as well as to establish a transition of care appropriately.    Hospitalization  Admit Date/Time: 12/29/2024 10:18 AM  Admitting Attending: Kilo Jamil  Discharge Date: 01/30/25    Discharge Attending Physician: Fabian Pozo MD   PCP name and Address: Roxana Latif, APRN [address] [place] [id number]  Referring provider name and address:   No referring provider defined for this encounter.    Chief Concern, Brief History of Present Illness, and Hospital Course  Noemi Casey is a 69-year-old with a PMH of HTN, COPD, bipolar disorder, nicotine dependence, and marijuana use who presented to the ED by EMS from home with hypothermia, hypotension, weakness, lethargy, diarrhea, SOA, poor oral intake x4 days. She was found to have lactic acidosis, RACHEL, colitis, flu A and strep pneumoniae.  She was also found to be in COPD exacerbation finished 5 day course of steroids currently on DuoNebs, doing better.  She also complained of abdominal pain, CT abdomen pelvis showed pancolitis, GI was consulted they did a flexible sigmoidoscopy on 01/09 that showed colitis. Biopsy showed extensive necrosis. General surgery consulted for ischemic colitis they recommended conservative treatment. Case discussed with Gastroenterology, they recommended continue to monitor as long as hemoglobin is stable. Notified general surgery, they want to continue conservative management for now, she is now on a full regular diet. Antibiotics were stopped on 01/20/2025. White count has downtrended no signs rectal bleeding.    69-year-old with a PMH of HTN, COPD, bipolar disorder, nicotine dependence, and marijuana use who presented to the ED by EMS from home with hypothermia, hypotension, weakness, lethargy, diarrhea, SOA, poor oral intake x4 days. She was found to have lactic acidosis, RACHEL, colitis, flu A and strep pneumoniae.     Pancolitis, ischemic colitis, Bloody bowel movements (pic in media, stool tinged with blood), History of previous small-bowel obstruction status post surgery-abdominal/pelvic CT scan on 1/3 showed diffuse wall thickening and mucosal hyperenhancement of the entire colon with surrounding inflammation consistent with pancolitis. s/p flexible sigmoidoscopy on 01/06, biopsy result on 01/08: Showed extensive necrosis. Hb dropped today (1/19) to 7.8. Bloody diarrhea, resolved - now less watery and semi-solid stool. CT abdomen and pelvis with IV Contrast on 01/19 showed significantly increased moderated to large left and new small right pleural effusions with complete left lower lobe and partial lingular and right lower lobe collapse. Suspect this is sympathetic effusion. Also with persistent diffuse wall thickening and mucosal hyperenhancement of the entire colon consistent with pancolitis, which could be  inflammatory, infectious or ischemic Patient currently without abdominal pain. Still getting some imodium periodically.   Discontinued Zosyn IV on 01/20/2025  Tolerating her regular diet well, will continue to monitor  Oral PPI daily  Will transfuse if Hb is <7     Impaired mobility: Reports has been using a cane for ambulation for six months due to weakness  PT/OT recs --> ELIGIO     History of hypertension: Home regimen: Amlodipine 5mg daily + clonidine 0.3mg + lisinopril-hydrochlorothiazide 20-12.5mg + toprol XL 50mg   Sequentially decreased and discontinued her BP meds as BP have been low normal.     As she recovers BP may increase and these may need to be resumed. If resumed, would start with her metoprolol and then ACE-I. Would try to avoid diuretics if possible.    Mood disorder/bipolar disorder: continue cariprazine    History of chronic obstructive pulmonary disease  Substance use disorder/marijuana: Endorses marijuana use; THC +; amphetamine + as well, pending confirmation, will follow-up--patient denies use     Left renal nodule 9 mm as an incidental finding on the CT scan: outpatient follow-up/monitoring     COPD  Continue Anoro 1 inhalation daily    Resolved/Inactive/Treated problems    Acute kidney injury, resolved  Hypoglycemia, resolved  Acute toxic/metabolic encephalopathy, resolved  Thrombocytopenia, resolved  Transaminitis, improving   Hypomagnesemia, resolved  Hypokalemia, resolved  FENGI: Regular Diet with supplment  DVT prophylaxis: D/C Lovenox enoxaparin 40 mg subcutaneous every 24 hours  GI prophylaxis: Continue with Protonix pantoprazole 40 mg by mouth daily before breakfast  Code status: Full code    Surgeries and Procedures  Flexible sigmoidoscopy 1/6/25    Consultants this admission  Gastroenterology  Emergency / General Surgery     PAST MEDICAL & SURGICAL HISTORY:   Past Medical History:   Diagnosis Date    Acute hypoxic respiratory failure     Acute kidney failure     RACHEL (acute kidney  injury)     Bipolar 1 disorder     Bipolar disorder     Bladder polyp     Bloody stool     Bowel obstruction 2005    Bradycardia     Cannabis use, unspecified, uncomplicated     Colon polyp 2018    COPD (chronic obstructive pulmonary disease)     Dehydration     Diarrhea     Diverticulosis     Elevated troponin     Genital warts due to HPV (human papillomavirus)     History of abnormal cervical Pap smear     History of bladder cancer 2017    History of colon polyps     History of small bowel obstruction     History of small bowel obstruction     History of traumatic brain injury 1998    Hyperlipidemia     Hypertension     Hypomagnesemia     Impaired mobility     Infectious gastroenteritis and colitis, unspecified     Internal hemorrhoids     Ischemic colitis     Lactic acidosis     Lethargy     Mitral valve regurgitation     MVP (mitral valve prolapse)     MVP (mitral valve prolapse)     Nicotine dependence     Pain in both feet     Palpitations     Pancolitis     Rhabdomyolysis     Sepsis     Septic colitis     SOB (shortness of breath)     Tattoo     Transaminitis     Unspecified abdominal pain     Weakness       Past Surgical History:   Procedure Laterality Date    BLADDER SURGERY  2008    cancer    COLON RESECTION  2007    due to bowel obstruction    COLONOSCOPY  2018    CRYOABLATION  1979    FLEXIBLE SIGMOIDOSCOPY  01/06/2025    TONSILLECTOMY      TOTAL ABDOMINAL HYSTERECTOMY WITH SALPINGO OOPHORECTOMY Bilateral 1999         MEDICATIONS:  I have reviewed and reconciled the patients medication list in the patients chart at the skilled nursing facility today.      ALLERGIES:    No Known Allergies      SOCIAL HISTORY:    Social History     Socioeconomic History    Marital status:    Tobacco Use    Smoking status: Former     Current packs/day: 1.00     Types: Cigarettes    Smokeless tobacco: Never   Vaping Use    Vaping status: Never Used   Substance and Sexual Activity    Alcohol use: Not Currently    Drug  use: Yes     Types: Marijuana    Sexual activity: Defer       FAMILY HISTORY:    Family History   Problem Relation Age of Onset    Ovarian cancer Mother     Osteoporosis Mother     Ovarian cancer Sister     Hypertension Father     Heart attack Father     Stroke Father     Diabetes Father     Hypertension Brother     Liver cancer Paternal Grandfather     Colon cancer Neg Hx        REVIEW OF SYSTEMS:    Review of Systems  Appetite: Fair []   Good [x]   Poor []   Weight Loss []  [x]  Weight Stable   Unavoidable Weight Loss []  Tolerating Tube Feeding []    Supplements Provided []   Constitutional: Negative for fever, chills, diaphoresis; improving malaise/fatigue.  HENT: No dysphagia; no changes to vision/hearing/smell/taste; no epistaxis  Eyes: Negative for redness and visual disturbance.   Respiratory: Chronic cough, productive of clear/yellow phlegm at times.  No hemoptysis.  Cardiovascular: Negative for chest pain and palpitations.   Gastrointestinal: Negative for abdominal distention, abdominal pain and blood in stool.   Endocrine: Negative for cold intolerance and heat intolerance.   Genitourinary: Negative for difficulty urinating, dysuria and frequency.Negative for hematuria   Musculoskeletal: Chronic myalgias and arthralgias  Integumentary: No open wounds, rash or concerning skin lesions  Neurological: Negative for syncope; improving generalized weakness.  Hematological: Negative for adenopathy. Does not bruise/bleed easily.   Immunological: Negative for reported allergies or immunological disorders  Psychological: No acute behavioral changes    PHYSICAL EXAMINATION:   VITAL SIGNS:   Vitals:    02/05/25 0913   BP: 134/66   Pulse: 107   Resp: 18   Temp: 97.8 °F (36.6 °C)   SpO2: 96%         Physical Exam  General Appearance:  [x]  Alert   [x]  Oriented x person  [x]  No acute distress     []  Confused  []  Disoriented   []  Comatose   Head:  Atraumatic and normocephalic, without obvious abnormality.   Eyes:          PERRLA, conjunctivae and sclerae normal, no Icterus. No pallor. Extra-occular movements are within normal limits.   Ears:  Ears appear intact with no abnormalities noted.   Throat: No oral lesions, no thrush, oral mucosa moist.   Neck: Supple, trachea midline, no thyromegaly, no carotid bruit.   Back:   No kyphoscoliosis. No tenderness to palpation.   Lungs:   Chest shape is normal. Breath sounds heard bilaterally equally.  No wheezing.  Audible air exchange noted all lung fields.   Heart:  Normal S1 and S2, no murmur, no gallop, no rub. No JVD.   Abdomen:   Normal bowel sounds, no masses, no organomegaly. Soft, non-tender, non-distended, no guarding.  No CVA tenderness.   Extremities: Moves all extremities well, edema, cyanosis or clubbing.  Frail build.  For core strength/stability.   Pulses: Pulses palpable and equal bilaterally.   Skin: No bleeding or rash.  Generalized dry skin noted.  Age-related atrophy of skin.   Neurologic: [x] Normal speech []  Normal mental status    [x] Cranial nerves II through XII intact   [x]  No anosmia [x]  DTR 2+ [x]  Proprioception intact  [x]  No focal motor/sensory deficits      Psych/Mood:                    [x]  No acute changes []  Depressed      Urinary:      [x]  Continent  []  Incontinent  []  Retention  []  F/C     []  UTI w/treatment in progress      ASSESSMENT     Diagnoses and all orders for this visit:    1. Impaired mobility and ADLs (Primary)    2. Physical deconditioning    3. Pancolitis    4. Essential hypertension    5. Bipolar affective disorder, remission status unspecified    6. Chronic obstructive bronchitis        PLAN  Planned utilization of skilled nursing facility to aid in mobilization/transfers, fall precautions in place given her impaired mobility and physical deconditioning.  Continue PT/OT for strengthening/stabilization exercise program.    Continue to follow nutritional/hydration status, no deficits reported at this time.  Continue to monitor  abdominal symptoms closely, does not demonstrate extension of pancolitis or worrisome findings consistent with sepsis at this time.    Vital signs demonstrate hemodynamic stability, blood pressure is at goal.    Lipids stable, no reports of SI/HI.    Demonstrates no increased work of breathing.    Surveillance labs as per routine/need.  [x]  Discussed Patient in detail with nursing/staff, addressed all needs today.     [x]  Plan of Care Reviewed   [x]  PT/OT Reviewed   []  Order Changes  [x]  Discharge Plans Reviewed  []  Code Status Change        I spent 60 minutes caring for Noemi on this date of service. This time includes time spent by me in the following activities:preparing for the visit, reviewing tests, obtaining and/or reviewing a separately obtained history, performing a medically appropriate examination and/or evaluation , counseling and educating the patient/family/caregiver, ordering medications, tests, or procedures, documenting information in the medical record, and care coordination    Vicente Ortega   02/05/2025

## 2025-03-04 ENCOUNTER — APPOINTMENT (OUTPATIENT)
Dept: CT IMAGING | Facility: HOSPITAL | Age: 70
End: 2025-03-04
Payer: MEDICARE

## 2025-03-04 ENCOUNTER — APPOINTMENT (OUTPATIENT)
Dept: GENERAL RADIOLOGY | Facility: HOSPITAL | Age: 70
End: 2025-03-04
Payer: MEDICARE

## 2025-03-04 ENCOUNTER — HOSPITAL ENCOUNTER (EMERGENCY)
Facility: HOSPITAL | Age: 70
Discharge: NURSING, REHAB OR LONG-TERM CARE | End: 2025-03-04
Attending: STUDENT IN AN ORGANIZED HEALTH CARE EDUCATION/TRAINING PROGRAM | Admitting: STUDENT IN AN ORGANIZED HEALTH CARE EDUCATION/TRAINING PROGRAM
Payer: MEDICARE

## 2025-03-04 VITALS
HEIGHT: 65 IN | HEART RATE: 82 BPM | OXYGEN SATURATION: 100 % | BODY MASS INDEX: 22.99 KG/M2 | WEIGHT: 138 LBS | RESPIRATION RATE: 18 BRPM | SYSTOLIC BLOOD PRESSURE: 121 MMHG | DIASTOLIC BLOOD PRESSURE: 98 MMHG | TEMPERATURE: 97.7 F

## 2025-03-04 DIAGNOSIS — R53.81 PHYSICAL DECONDITIONING: ICD-10-CM

## 2025-03-04 DIAGNOSIS — E03.9 HYPOTHYROIDISM, UNSPECIFIED TYPE: Primary | ICD-10-CM

## 2025-03-04 DIAGNOSIS — K51.00 PANCOLITIS: ICD-10-CM

## 2025-03-04 DIAGNOSIS — J90 PLEURAL EFFUSION: ICD-10-CM

## 2025-03-04 DIAGNOSIS — D64.9 ANEMIA, UNSPECIFIED TYPE: ICD-10-CM

## 2025-03-04 LAB
ALBUMIN SERPL-MCNC: 1.8 G/DL (ref 3.5–5.2)
ALBUMIN/GLOB SERPL: 0.6 G/DL
ALP SERPL-CCNC: 129 U/L (ref 39–117)
ALT SERPL W P-5'-P-CCNC: 17 U/L (ref 1–33)
ANION GAP SERPL CALCULATED.3IONS-SCNC: 6.7 MMOL/L (ref 5–15)
AST SERPL-CCNC: 18 U/L (ref 1–32)
BILIRUB SERPL-MCNC: 0.2 MG/DL (ref 0–1.2)
BUN SERPL-MCNC: 18 MG/DL (ref 8–23)
BUN/CREAT SERPL: 29 (ref 7–25)
CALCIUM SPEC-SCNC: 7.6 MG/DL (ref 8.6–10.5)
CHLORIDE SERPL-SCNC: 109 MMOL/L (ref 98–107)
CK SERPL-CCNC: 14 U/L (ref 20–180)
CO2 SERPL-SCNC: 27.3 MMOL/L (ref 22–29)
CREAT SERPL-MCNC: 0.62 MG/DL (ref 0.57–1)
CRP SERPL-MCNC: 4.46 MG/DL (ref 0–0.5)
D-LACTATE SERPL-SCNC: 0.8 MMOL/L (ref 0.5–2)
DEPRECATED RDW RBC AUTO: 67.5 FL (ref 37–54)
EGFRCR SERPLBLD CKD-EPI 2021: 97.1 ML/MIN/1.73
EOSINOPHIL # BLD MANUAL: 0.17 10*3/MM3 (ref 0–0.4)
EOSINOPHIL NFR BLD MANUAL: 2 % (ref 0.3–6.2)
ERYTHROCYTE [DISTWIDTH] IN BLOOD BY AUTOMATED COUNT: 18.9 % (ref 12.3–15.4)
GEN 5 1HR TROPONIN T REFLEX: 10 NG/L
GLOBULIN UR ELPH-MCNC: 2.9 GM/DL
GLUCOSE SERPL-MCNC: 81 MG/DL (ref 65–99)
HCT VFR BLD AUTO: 30.7 % (ref 34–46.6)
HGB BLD-MCNC: 9.3 G/DL (ref 12–15.9)
HOLD SPECIMEN: NORMAL
HOLD SPECIMEN: NORMAL
LYMPHOCYTES # BLD MANUAL: 3.66 10*3/MM3 (ref 0.7–3.1)
LYMPHOCYTES NFR BLD MANUAL: 3 % (ref 5–12)
MAGNESIUM SERPL-MCNC: 2.1 MG/DL (ref 1.6–2.4)
MCH RBC QN AUTO: 29.8 PG (ref 26.6–33)
MCHC RBC AUTO-ENTMCNC: 30.3 G/DL (ref 31.5–35.7)
MCV RBC AUTO: 98.4 FL (ref 79–97)
MONOCYTES # BLD: 0.26 10*3/MM3 (ref 0.1–0.9)
NEUTROPHILS # BLD AUTO: 4.62 10*3/MM3 (ref 1.7–7)
NEUTROPHILS NFR BLD MANUAL: 41 % (ref 42.7–76)
NEUTS BAND NFR BLD MANUAL: 12 % (ref 0–5)
PLATELET # BLD AUTO: 327 10*3/MM3 (ref 140–450)
PMV BLD AUTO: 9.7 FL (ref 6–12)
POTASSIUM SERPL-SCNC: 4.4 MMOL/L (ref 3.5–5.2)
PROCALCITONIN SERPL-MCNC: 0.69 NG/ML (ref 0–0.25)
PROT SERPL-MCNC: 4.7 G/DL (ref 6–8.5)
RBC # BLD AUTO: 3.12 10*6/MM3 (ref 3.77–5.28)
RBC MORPH BLD: NORMAL
SMALL PLATELETS BLD QL SMEAR: ADEQUATE
SODIUM SERPL-SCNC: 143 MMOL/L (ref 136–145)
T4 FREE SERPL-MCNC: 0.85 NG/DL (ref 0.92–1.68)
TROPONIN T NUMERIC DELTA: 0 NG/L
TROPONIN T SERPL HS-MCNC: 10 NG/L
TSH SERPL DL<=0.05 MIU/L-ACNC: 7.7 UIU/ML (ref 0.27–4.2)
VARIANT LYMPHS NFR BLD MANUAL: 2 % (ref 0–5)
VARIANT LYMPHS NFR BLD MANUAL: 40 % (ref 19.6–45.3)
WBC MORPH BLD: NORMAL
WBC NRBC COR # BLD AUTO: 8.72 10*3/MM3 (ref 3.4–10.8)
WHOLE BLOOD HOLD COAG: NORMAL
WHOLE BLOOD HOLD SPECIMEN: NORMAL

## 2025-03-04 PROCEDURE — 25510000001 IOPAMIDOL 61 % SOLUTION: Performed by: STUDENT IN AN ORGANIZED HEALTH CARE EDUCATION/TRAINING PROGRAM

## 2025-03-04 PROCEDURE — 71275 CT ANGIOGRAPHY CHEST: CPT

## 2025-03-04 PROCEDURE — 99285 EMERGENCY DEPT VISIT HI MDM: CPT | Performed by: STUDENT IN AN ORGANIZED HEALTH CARE EDUCATION/TRAINING PROGRAM

## 2025-03-04 PROCEDURE — 83735 ASSAY OF MAGNESIUM: CPT | Performed by: STUDENT IN AN ORGANIZED HEALTH CARE EDUCATION/TRAINING PROGRAM

## 2025-03-04 PROCEDURE — 84443 ASSAY THYROID STIM HORMONE: CPT | Performed by: STUDENT IN AN ORGANIZED HEALTH CARE EDUCATION/TRAINING PROGRAM

## 2025-03-04 PROCEDURE — 36415 COLL VENOUS BLD VENIPUNCTURE: CPT

## 2025-03-04 PROCEDURE — 71045 X-RAY EXAM CHEST 1 VIEW: CPT

## 2025-03-04 PROCEDURE — 70450 CT HEAD/BRAIN W/O DYE: CPT

## 2025-03-04 PROCEDURE — 82550 ASSAY OF CK (CPK): CPT | Performed by: STUDENT IN AN ORGANIZED HEALTH CARE EDUCATION/TRAINING PROGRAM

## 2025-03-04 PROCEDURE — 86140 C-REACTIVE PROTEIN: CPT | Performed by: STUDENT IN AN ORGANIZED HEALTH CARE EDUCATION/TRAINING PROGRAM

## 2025-03-04 PROCEDURE — 83605 ASSAY OF LACTIC ACID: CPT | Performed by: STUDENT IN AN ORGANIZED HEALTH CARE EDUCATION/TRAINING PROGRAM

## 2025-03-04 PROCEDURE — 85007 BL SMEAR W/DIFF WBC COUNT: CPT | Performed by: STUDENT IN AN ORGANIZED HEALTH CARE EDUCATION/TRAINING PROGRAM

## 2025-03-04 PROCEDURE — 80053 COMPREHEN METABOLIC PANEL: CPT | Performed by: STUDENT IN AN ORGANIZED HEALTH CARE EDUCATION/TRAINING PROGRAM

## 2025-03-04 PROCEDURE — 74177 CT ABD & PELVIS W/CONTRAST: CPT

## 2025-03-04 PROCEDURE — 25810000003 SODIUM CHLORIDE 0.9 % SOLUTION: Performed by: STUDENT IN AN ORGANIZED HEALTH CARE EDUCATION/TRAINING PROGRAM

## 2025-03-04 PROCEDURE — 84439 ASSAY OF FREE THYROXINE: CPT | Performed by: STUDENT IN AN ORGANIZED HEALTH CARE EDUCATION/TRAINING PROGRAM

## 2025-03-04 PROCEDURE — 93005 ELECTROCARDIOGRAM TRACING: CPT | Performed by: STUDENT IN AN ORGANIZED HEALTH CARE EDUCATION/TRAINING PROGRAM

## 2025-03-04 PROCEDURE — 84484 ASSAY OF TROPONIN QUANT: CPT | Performed by: STUDENT IN AN ORGANIZED HEALTH CARE EDUCATION/TRAINING PROGRAM

## 2025-03-04 PROCEDURE — 85025 COMPLETE CBC W/AUTO DIFF WBC: CPT | Performed by: STUDENT IN AN ORGANIZED HEALTH CARE EDUCATION/TRAINING PROGRAM

## 2025-03-04 PROCEDURE — 84145 PROCALCITONIN (PCT): CPT | Performed by: STUDENT IN AN ORGANIZED HEALTH CARE EDUCATION/TRAINING PROGRAM

## 2025-03-04 RX ORDER — SODIUM CHLORIDE 0.9 % (FLUSH) 0.9 %
10 SYRINGE (ML) INJECTION AS NEEDED
Status: DISCONTINUED | OUTPATIENT
Start: 2025-03-04 | End: 2025-03-04 | Stop reason: HOSPADM

## 2025-03-04 RX ORDER — LEVOTHYROXINE SODIUM 50 UG/1
50 TABLET ORAL
Qty: 14 TABLET | Refills: 0 | Status: SHIPPED | OUTPATIENT
Start: 2025-03-04 | End: 2025-03-18

## 2025-03-04 RX ORDER — IOPAMIDOL 612 MG/ML
100 INJECTION, SOLUTION INTRAVASCULAR
Status: COMPLETED | OUTPATIENT
Start: 2025-03-04 | End: 2025-03-04

## 2025-03-04 RX ADMIN — SODIUM CHLORIDE 1000 ML: 9 INJECTION, SOLUTION INTRAVENOUS at 13:56

## 2025-03-04 RX ADMIN — IOPAMIDOL 100 ML: 612 INJECTION, SOLUTION INTRAVENOUS at 14:59

## 2025-03-04 NOTE — ED PROVIDER NOTES
[facility] EMERGENCY DEPARTMENT  Emergency Department Encounter  Emergency Medicine Physician Note       Pt Name: Noemi Casey  MRN: [id number]  Pt :   1955  Room Number:    Date of encounter:  3/4/2025  PCP: Madhav Mitchell MD  ED Provider: Miguel Layne MD    Historian: Patient      HPI:  Chief Complaint: Generalized weakness, fatigue        Context: Noemi Casey is a 70 y.o. female who presents to the ED for generalized weakness and fatigue.  Patient states she has been debilitated at [facility] stating is more difficult moving around and walking she states she has had fatigue and feels tired all the time.  She does not have any specific symptoms but states she has a prior history of pancolitis with chronic diarrhea however she states overall this is improving.  No blood in stool.  No nausea or vomiting.  She is a mild cough nonproductive.  She denies headaches.  No fevers.  She has started to develop a small wound on her buttock at the nursing facility.      PAST MEDICAL HISTORY  Past Medical History:   Diagnosis Date    Acute hypoxic respiratory failure     Acute kidney failure     RACHEL (acute kidney injury)     Bipolar 1 disorder     Bipolar disorder     Bladder polyp     Bloody stool     Bowel obstruction     Bradycardia     Cannabis use, unspecified, uncomplicated     Colon polyp 2018    COPD (chronic obstructive pulmonary disease)     Dehydration     Diarrhea     Diverticulosis     Elevated troponin     Genital warts due to HPV (human papillomavirus)     History of abnormal cervical Pap smear     History of bladder cancer 2017    History of colon polyps     History of small bowel obstruction     History of small bowel obstruction     History of traumatic brain injury 1998    Hyperlipidemia     Hypertension     Hypomagnesemia     Impaired mobility     Infectious gastroenteritis and colitis, unspecified     Internal hemorrhoids     Ischemic colitis      Lactic acidosis     Lethargy     Mitral valve regurgitation     MVP (mitral valve prolapse)     MVP (mitral valve prolapse)     Nicotine dependence     Pain in both feet     Palpitations     Pancolitis     Rhabdomyolysis     Sepsis     Septic colitis     SOB (shortness of breath)     Tattoo     Transaminitis     Unspecified abdominal pain     Weakness          PAST SURGICAL HISTORY  Past Surgical History:   Procedure Laterality Date    BLADDER SURGERY  2008    cancer    COLON RESECTION  2007    due to bowel obstruction    COLONOSCOPY  2018    CRYOABLATION  1979    FLEXIBLE SIGMOIDOSCOPY  01/06/2025    TONSILLECTOMY      TOTAL ABDOMINAL HYSTERECTOMY WITH SALPINGO OOPHORECTOMY Bilateral 1999         FAMILY HISTORY  Family History   Problem Relation Age of Onset    Ovarian cancer Mother     Osteoporosis Mother     Ovarian cancer Sister     Hypertension Father     Heart attack Father     Stroke Father     Diabetes Father     Hypertension Brother     Liver cancer Paternal Grandfather     Colon cancer Neg Hx          SOCIAL HISTORY  Social History     Socioeconomic History    Marital status:    Tobacco Use    Smoking status: Former     Current packs/day: 1.00     Types: Cigarettes    Smokeless tobacco: Never   Vaping Use    Vaping status: Never Used   Substance and Sexual Activity    Alcohol use: Not Currently    Drug use: Yes     Types: Marijuana    Sexual activity: Defer         ALLERGIES  Patient has no known allergies.        REVIEW OF SYSTEMS  As noted in HPI      PHYSICAL EXAM    I have reviewed the triage vital signs and nursing notes.    ED Triage Vitals [03/04/25 1210]   Temp Heart Rate Resp BP SpO2   97.7 °F (36.5 °C) 93 18 113/88 97 %      Temp src Heart Rate Source Patient Position BP Location FiO2 (%)   Oral Monitor -- -- --       Physical Exam  Constitutional:       Comments: Frail appearing but in no acute distress   Cardiovascular:      Rate and Rhythm: Normal rate and regular rhythm.      Pulses:  Normal pulses.   Pulmonary:      Effort: Pulmonary effort is normal. No respiratory distress.      Breath sounds: No wheezing or rales.   Abdominal:      General: There is no distension.      Palpations: Abdomen is soft.      Tenderness: There is abdominal tenderness in the left lower quadrant.   Musculoskeletal:         General: No deformity.      Cervical back: Neck supple.      Comments: Trace edema lower extremities   Skin:     General: Skin is warm.      Comments: Small area of mild redness to sacrum/gluteal fold consistent with mild pressure injury   Neurological:      General: No focal deficit present.      Mental Status: She is alert.      Comments: Equal  strength.  Normal sensation.  Able to lift both extremities off the bed.  Coordination intact.   Psychiatric:         Behavior: Behavior normal.         LAB RESULTS  No results found for this or any previous visit (from the past 24 hours).      If labs were ordered, I independently reviewed the results and considered them in treating the patient.        RADIOLOGY  No Radiology Exams Resulted Within Past 24 Hours    PROCEDURES    Procedures    ECG 12 Lead ED Triage Standing Order; Weak / Dizzy / AMS   Final Result          MEDICATIONS GIVEN IN ER    Medications   sodium chloride 0.9 % bolus 1,000 mL (0 mL Intravenous Stopped 3/4/25 1426)   iopamidol (ISOVUE-300) 61 % injection 100 mL (100 mL Intravenous Given 3/4/25 0220)         MEDICAL DECISION MAKING, PROGRESS, and CONSULTS    All labs, if obtained, have been independently reviewed by me.  All radiology studies, if obtained, have been reviewed by me and the radiologist dictating the report.  All EKG's, if obtained, have been independently viewed and interpreted by me.      Discussion below represents my analysis of pertinent findings related to patient's condition, differential diagnosis, treatment plan and final disposition.                         Differential diagnosis:    Anemia, thyroid  disorder, electrolyte abnormality, pneumonia, diarrheal illness, RACHEL, dehydration, intracranial mass, others.      Additional sources:    - Discussed/ obtained information from independent historians:      - External (non-ED) record review: Discharge summary from [facility] 1/30/2025    - Chronic or social conditions impacting care: Deconditioning, nursing facility resident    - Shared decision making:        Orders placed during this visit:  Orders Placed This Encounter   Procedures    XR Chest 1 View    CT Angiogram Chest Pulmonary Embolism    CT Abdomen Pelvis With Contrast    CT Head Without Contrast    Marana Draw    Comprehensive Metabolic Panel    High Sensitivity Troponin T    Magnesium    CBC Auto Differential    Manual Differential    High Sensitivity Troponin T 1Hr    Lactic Acid, Plasma    Procalcitonin    C-reactive Protein    CK    TSH Rfx On Abnormal To Free T4    T4, Free    Undress & Gown    Continuous Pulse Oximetry    Vital Signs    ECG 12 Lead ED Triage Standing Order; Weak / Dizzy / AMS    CBC & Differential    Green Top (Gel)    Lavender Top    Gold Top - SST    Light Blue Top         Additional orders considered but not ordered:      ED Course/MDM Discussion:    Patient is a 70-year-old female with notable history of hypertension, COPD, bipolar disorder, recent admission for pancolitis/ischemic colitis and impaired mobility among others presented for generalized weakness progressive.    Patient on examination has no focal or lateralizing deficits.  She appears chronically weak and frail but in no acute distress she does not appear septic.  She has reassuring vital signs she is on her baseline 2 L nasal cannula.    EKG demonstrated sinus rhythm low voltage nonspecific T wave flattening and T wave inversion V3 on my interpretation.  Her troponins are normal at 0 and 1 hour and NSTEMI has been excluded.  Atypical ACS is not suspected.  Her labs are reassuring she has no leukocytosis.  Her  anemia is up trended from previous admission at .  Her relative bands are marginally elevated however inflammatory markers and normal white count do not suggest infectious etiology.  She does not have any urinary complaints namely no dysuria.  Her electrolytes are reassuring.  CT imaging obtained.  I appreciated no large ICH or mass occupying lesion of CT of the head.  CT of the chest demonstrated cardiomegaly with bilateral pleural effusions possible CHF.  Patient is stable on baseline nasal cannula oxygen.  She is not having any overt shortness of breath.  CT abdomen and pelvis demonstrated pancolitis similar to previous.  Patient states overall the symptoms are improving she was previously diagnosed with ischemic colitis.  She was noted to have mildly elevated TSH and mildly low free T4.  No overt symptoms of myxedema coma however do think hypothyroidism could be contributing to her symptoms.  I recommended close outpatient follow-up for this.  She states it would be several weeks before she can see her primary care provider.  Discussed risk and benefits of starting low-dose levothyroxine and through shared decision making she wishes to trial this.  Recommended close follow-up for repeat thyroid studies.  She was given IV fluids for rehydration and feels she responded to this as well.  Overall her chronic illnesses do appear to be stable and at this time do not think she would benefit from admission to the hospital.  I recommended close outpatient GI follow-up for history of ischemic colitis.  I recommend close PCP follow-up for chronic medical conditions including but not limited to anemia, pleural effusion/CHF, hypothyroidism.  Counseled the patient on red flag symptoms and return precautions for which she demonstrated understanding.  Patient stable for discharge back to facility and is in agreement with plan.                    Consultants:      Shared Decision Making:  After my consideration of clinical  presentation and any laboratory/radiology studies obtained, I discussed the findings with the patient/patient representative who is in agreement with the treatment plan and the final disposition.   Risks and benefits of discharge and/or observation/admission were discussed.       AS OF 07:03 EST VITALS:    BP - 121/98  HR - 82  TEMP - 97.7 °F (36.5 °C) (Oral)  O2 SATS - 100%                  DIAGNOSIS  Final diagnoses:   Hypothyroidism, unspecified type   Pancolitis   Pleural effusion   Physical deconditioning   Anemia, unspecified type         DISPOSITION  ED Disposition       ED Disposition   Discharge    Condition   Stable    Comment   --                   Please note that portions of this document were completed with voice recognition software.        Miguel Layne MD  03/06/25 0759

## 2025-03-04 NOTE — DISCHARGE INSTRUCTIONS
Please follow-up with your primary care provider for chronic medical conditions including effusion, anemia.  You are found to have hypothyroidism on labs today.  You were started on a low-dose of Synthroid.  Please follow-up with your primary care provider to monitor these labs and response to medication and guide further management.  Please follow-up with gastroenterology for pancolitis.  Do not hesitate to return if symptoms worsen in any way.

## 2025-03-12 RX ORDER — DRONABINOL 2.5 MG/1
2.5 CAPSULE ORAL
Qty: 60 CAPSULE | Refills: 0 | Status: SHIPPED | OUTPATIENT
Start: 2025-03-12

## 2025-03-12 NOTE — TELEPHONE ENCOUNTER
(NEW SCRIPT)    АННА REQUESTING MED REFILL FOR DRONABINOL 2.5 MG.    DIRECTIONS: DRONABINOL 2.5 MG 1 CAP PO BID.